# Patient Record
Sex: FEMALE | Race: BLACK OR AFRICAN AMERICAN
[De-identification: names, ages, dates, MRNs, and addresses within clinical notes are randomized per-mention and may not be internally consistent; named-entity substitution may affect disease eponyms.]

---

## 2017-03-18 ENCOUNTER — HOSPITAL ENCOUNTER (EMERGENCY)
Dept: HOSPITAL 62 - ER | Age: 67
Discharge: HOME | End: 2017-03-18
Payer: SELF-PAY

## 2017-03-18 VITALS — DIASTOLIC BLOOD PRESSURE: 98 MMHG | SYSTOLIC BLOOD PRESSURE: 192 MMHG

## 2017-03-18 DIAGNOSIS — R04.0: Primary | ICD-10-CM

## 2017-03-18 DIAGNOSIS — F17.210: ICD-10-CM

## 2017-03-18 DIAGNOSIS — R03.0: ICD-10-CM

## 2017-03-18 PROCEDURE — 99283 EMERGENCY DEPT VISIT LOW MDM: CPT

## 2017-03-18 NOTE — ER DOCUMENT REPORT
HPI





- HPI


Patient complains to provider of: nose bleed


Onset: This morning


Onset/Duration: Sudden


Pain Level: 5


Context: 


Pt presents to the ED with c/o nose bleed pto. She reports she has had this in 

the past. Once she had to go the ED four times in one day because it would not 

stop bleeding. She denies injury.  Patient reports she sleeps with the window 

open.  Does not use a humidifier.  Pt bp elevated. She reports hx of High blood 

pressure, untreated. She does not have insurance. She reports pressure headache 

now, no medications taken. Denies f/n/v/d.   Patient also admits smoking and 

drinking tequila weekly. No active nose bleed now. 


Associated Symptoms: Headache


Exacerbated by: Denies


Relieved by: Denies


Similar symptoms previously: Yes


Recently seen / treated by doctor: No





- DERM


Skin Color: Normal





Past Medical History





- General


Information source: Patient


Last Menstrual Period: years- menopause





- Social History


Smoking Status: Current Every Day Smoker


Cigarette use (# per day): Yes - gulshan


Chew tobacco use (# tins/day): No


Frequency of alcohol use: Social


Drug Abuse: None


Occupation: none


Lives with: Family


Family History: Reviewed & Not Pertinent


Patient has suicidal ideation: No


Patient has homicidal ideation: No





- Past Medical History


Cardiac Medical History: Reports: Hx Hypertension


Renal/ Medical History: Denies: Hx Peritoneal Dialysis


Surgical Hx: Negative





- Immunizations


Hx Diphtheria, Pertussis, Tetanus Vaccination: No





Vertical Provider Document





- CONSTITUTIONAL


Agree With Documented VS: Yes


Exam Limitations: No Limitations


General Appearance: WD/WN, No Apparent Distress





- INFECTION CONTROL


TRAVEL OUTSIDE OF THE [place] IN LAST 30 DAYS: No





- HEENT


HEENT: Atraumatic, Normocephalic.  negative: Conjuctival Injection, Pharyngeal 

Exudate, Pharyngeal Tenderness, Pharyngeal Erythema, Tympanic Membrane Red, 

Tympanic Membrane Bulging


Notes: 


right nare with erythema,no active bleeding, no septal hematoma





- NECK


Neck: Normal Inspection, Supple.  negative: Lymphadenopathy-Left, 

Lymphadenopathy-Right





- RESPIRATORY


Respiratory: Breath Sounds Normal, No Respiratory Distress


O2 Sat by Pulse Oximetry: 96





- CARDIOVASCULAR


Cardiovascular: Regular Rate, Regular Rhythm





- GI/ABDOMEN


Gastrointestinal: Abdomen Soft





- MUSCULOSKELETAL/EXTREMETIES


Musculoskeletal/Extremeties: MAEW, FROM





- NEURO


Level of Consciousness: Awake, Alert, Appropriate


Motor/Sensory: No Motor Deficit





- DERM


Integumentary: Warm, Dry





Course





- Re-evaluation


Re-evalutation: 


03/18/17 13:31


Pt instructed on care of nose bleeds. I spent over 15 minutes discussing HTN. 

Pt was instructed on the [facility], low sodium diet, exercise, 

quit smoking. Pt was also instructed on the risks of high blood pressure (stroke

, MI, etc.)  patient verbalized understanding to all instructions.  Patient 

reports she will follow-up with [facility].  Patient also reported 

that she will quit adding extra seasoning to her foods. 





 





- Vital Signs


Vital signs: 


 











Temp Pulse Resp BP Pulse Ox


 


 98.4 F   88   16   189/92 H  96 


 


 03/18/17 12:17  03/18/17 12:17  03/18/17 12:17  03/18/17 12:17  03/18/17 12:17














Discharge





- Discharge


Clinical Impression: 


 Bleeding nose, Elevated blood pressure reading





Condition: Stable


Disposition: HOME, SELF-CARE


Instructions:  High Blood Pressure ([facility]), [facility], Stop Smoking 

([facility])


Additional Instructions: 


*You have been evaluated for nose bleed, elevated blood pressure


*Monitor your diet- low sodium, quit smoking ,exercise


*Do not blow your nose, humidified air


*Take motrin as indicated


*Follow up with the [facility] within one week


*Return to ED for worsening condition, changes, needs


Forms:  Smoking Cessation Education, Elevated Blood Pressure

## 2017-03-18 NOTE — ER DOCUMENT REPORT
ED Medical Screen (RME)





- General


Stated Complaint: NOSE BLEED


Notes: 


patient states she had a nosebleed one hour ago, has stopped. she does admit to 

a headache that started today


has a h/o HTN but she is uninsured and does not see a PCP





I have greeted and performed a rapid initial assessment of this patient. A 

comprehensive ED assessment and evaluation of the patient, analysis of test 

results and completion of the medical decision making process will be conducted 

by additional ED providers.











- Related Data


Allergies/Adverse Reactions: 


 





acetaminophen [From Tylenol] Allergy (Verified 03/18/17 12:16)


 











Past Medical History





- Past Medical History


Cardiac Medical History: Reports: Hx Hypertension





- Immunizations


Hx Diphtheria, Pertussis, Tetanus Vaccination: No

## 2017-04-11 ENCOUNTER — HOSPITAL ENCOUNTER (OUTPATIENT)
Dept: HOSPITAL 62 - WI | Age: 67
End: 2017-04-11
Payer: COMMERCIAL

## 2017-04-11 DIAGNOSIS — Z12.31: Primary | ICD-10-CM

## 2017-04-11 PROCEDURE — G0202 SCR MAMMO BI INCL CAD: HCPCS

## 2017-04-11 PROCEDURE — 77067 SCR MAMMO BI INCL CAD: CPT

## 2017-06-27 ENCOUNTER — HOSPITAL ENCOUNTER (EMERGENCY)
Dept: HOSPITAL 62 - ER | Age: 67
Discharge: HOME | End: 2017-06-27
Payer: SELF-PAY

## 2017-06-27 VITALS — DIASTOLIC BLOOD PRESSURE: 95 MMHG | SYSTOLIC BLOOD PRESSURE: 190 MMHG

## 2017-06-27 DIAGNOSIS — R21: Primary | ICD-10-CM

## 2017-06-27 DIAGNOSIS — I10: ICD-10-CM

## 2017-06-27 DIAGNOSIS — F17.200: ICD-10-CM

## 2017-06-27 PROCEDURE — 99282 EMERGENCY DEPT VISIT SF MDM: CPT

## 2017-06-27 NOTE — ER DOCUMENT REPORT
HPI





- HPI


Patient complains to provider of: skin rash


Onset: Other - 7 months


Onset/Duration: Waxing and waning


Quality of pain: No pain


Pain Level: Denies


Context: 


Patient presents complaining of skin rash to face and neck area off and on for 

the past 7 months.  Patient states rash has become constant for the past month.

  Patient became concerned about the skin rash as she has an upcoming family 

reunion.  Patient denies any new detergents or medications.  Patient does 

states she has been using various topical over-the-counter medications such as 

antibiotic cream and attempts to treat her skin rash.


Associated Symptoms: Other - skin rash


Exacerbated by: Denies


Relieved by: Denies


Similar symptoms previously: Yes


Recently seen / treated by doctor: No





- ROS


ROS below otherwise negative: Yes


Systems Reviewed and Negative: Yes All other systems reviewed and negative





- CONSTITUTIONAL


Constitutional: DENIES: Fever, Chills





- EENT


EENT: DENIES: Eye problems





- NEURO


Neurology: DENIES: Headache





- GASTROINTESTINAL


Gastrointestinal: DENIES: Nausea, Patient vomiting





- DERM


Skin Color: Normal


Skin Problems: Rash





Past Medical History





- General


Information source: Patient





- Social History


Smoking Status: Current Every Day Smoker


Frequency of alcohol use: Occasional


Drug Abuse: None


Occupation: none


Family History: Reviewed & Not Pertinent


Patient has suicidal ideation: No


Patient has homicidal ideation: No





- Past Medical History


Cardiac Medical History: Reports: Hx Hypertension


Renal/ Medical History: Denies: Hx Peritoneal Dialysis


Surgical Hx: Negative





- Immunizations


Hx Diphtheria, Pertussis, Tetanus Vaccination: No





Vertical Provider Document





- CONSTITUTIONAL


Agree With Documented VS: Yes


Exam Limitations: No Limitations


General Appearance: WD/WN, No Apparent Distress





- INFECTION CONTROL


TRAVEL OUTSIDE OF THE [place] IN LAST 30 DAYS: No





- HEENT


HEENT: Atraumatic, Normal ENT Exam, Normocephalic


Notes: 


No angioedema





- NECK


Neck: Normal Inspection, Supple.  negative: Lymphadenopathy-Left, 

Lymphadenopathy-Right





- RESPIRATORY


Respiratory: Breath Sounds Normal, No Respiratory Distress, Chest Non-Tender


O2 Sat by Pulse Oximetry: 98





- CARDIOVASCULAR


Cardiovascular: Regular Rate, Regular Rhythm, No Murmur





- MUSCULOSKELETAL/EXTREMETIES


Musculoskeletal/Extremeties: MAEW





- NEURO


Level of Consciousness: Awake, Alert, Appropriate


Motor/Sensory: No Motor Deficit





- DERM


Integumentary: Warm, Dry, Rash - Erythematous maculopapular rash distributed to 

face, neck and upper chest and back area.





Course





- Re-evaluation


Re-evalutation: 





06/27/17 10:49


The patient has been informed that they may have pre-hypertension or 

hypertension based on a blood pressure reading in the emergency department.  I 

recommend that patient call the primary care provider listed on their discharge 

instructions or a physician of their choice by this week to arrange follow-up 

for further evaluation of possible pre-hypertension her hypertension.





- Vital Signs


Vital signs: 


 











Temp Pulse Resp BP Pulse Ox


 


 98.1 F   102 H  16   190/95 H  98 


 


 06/27/17 10:25  06/27/17 10:25  06/27/17 10:25  06/27/17 10:25  06/27/17 10:25














Discharge





- Discharge


Clinical Impression: 


 Hx of essential hypertension, Skin rash





Condition: Stable


Disposition: HOME, SELF-CARE


Instructions:  Use of Diphenhydramine, Steroid Medication


Additional Instructions: 


Return immediately for any new or worsening symptoms





Followup with your primary care provider, call tomorrow to make a followup 

appointment





Follow-up with a dermatologist for further evaluation of facial rash





Follow-up with your primary doctor to recheck your blood pressure next week


Prescriptions: 


Prednisone [Deltasone 20 mg Tablet] 3 tab PO DAILY 4 Days


Forms:  Elevated Blood Pressure


Referrals: 


[facility] [Provider Group] - Follow up as needed


SLIVIA COREAS DO [ACTIVE STAFF] - Follow up in 3-5 days

## 2017-09-19 ENCOUNTER — HOSPITAL ENCOUNTER (EMERGENCY)
Dept: HOSPITAL 62 - ER | Age: 67
Discharge: HOME | End: 2017-09-19
Payer: MEDICARE

## 2017-09-19 VITALS — DIASTOLIC BLOOD PRESSURE: 104 MMHG | SYSTOLIC BLOOD PRESSURE: 183 MMHG

## 2017-09-19 DIAGNOSIS — F17.200: ICD-10-CM

## 2017-09-19 DIAGNOSIS — H10.9: Primary | ICD-10-CM

## 2017-09-19 DIAGNOSIS — I10: ICD-10-CM

## 2017-09-19 PROCEDURE — 99283 EMERGENCY DEPT VISIT LOW MDM: CPT

## 2017-09-19 NOTE — ER DOCUMENT REPORT
HPI





- HPI


Onset: Yesterday


Onset/Duration: Gradual


Quality of pain: Burning


Severity: Moderate


Pain Level: 4


Context: 





Patient states her left eye started out itching yesterday and she has been 

rubbing it.  I is now red and has clear drainage.  Patient does not wear 

contacts.  Patient states vision is normal.


Exacerbated by: Denies


Relieved by: Denies


Similar symptoms previously: No


Recently seen / treated by doctor: No





- ROS


ROS below otherwise negative: Yes


Systems Reviewed and Negative: Yes All other systems reviewed and negative





- CONSTITUTIONAL


Constitutional: DENIES: Fever





- EENT


EENT: REPORTS: Eye problems - Left.  DENIES: Congestion





- NEURO


Neurology: REPORTS: Headache





- CARDIOVASCULAR


Cardiovascular: DENIES: Chest pain





- RESPIRATORY


Respiratory: DENIES: Trouble Breathing





- GASTROINTESTINAL


Gastrointestinal: DENIES: Abdominal Pain





- REPRODUCTIVE


Reproductive: DENIES: Pregnant:





- MUSCULOSKELETAL


Musculoskeletal: DENIES: Extremity pain





- DERM


Skin Color: Normal


Skin Problems: None





Past Medical History





- General


Information source: Patient





- Social History


Smoking Status: Current Every Day Smoker


Chew tobacco use (# tins/day): No


Frequency of alcohol use: Occasional


Drug Abuse: None


Lives with: Spouse/Significant other


Family History: Reviewed & Not Pertinent


Patient has suicidal ideation: No


Patient has homicidal ideation: No





- Past Medical History


Cardiac Medical History: Reports: Hx Hypertension


Past Surgical History: Reports: Hx Herniorrhaphy





- Immunizations


Hx Diphtheria, Pertussis, Tetanus Vaccination: No





Vertical Provider Document





- CONSTITUTIONAL


Agree With Documented VS: Yes


Exam Limitations: No Limitations


General Appearance: WD/WN, No Apparent Distress





- INFECTION CONTROL


TRAVEL OUTSIDE OF THE [place] IN LAST 30 DAYS: No





- HEENT


HEENT: Atraumatic, Conjuctival Injection, Normal ENT Exam, Normocephalic, PERRLA


Notes: 





Left eye injected without drainage or tearing noted.  Small amount of colored 

drainage noted to right inner eye, conjunctivae clear.





- NECK


Neck: Normal Inspection





- RESPIRATORY


Respiratory: Breath Sounds Normal, No Respiratory Distress


O2 Sat by Pulse Oximetry: 98





- CARDIOVASCULAR


Cardiovascular: Regular Rate, Regular Rhythm





- MUSCULOSKELETAL/EXTREMETIES


Musculoskeletal/Extremeties: MAEW





- NEURO


Level of Consciousness: Awake, Alert, Appropriate





- DERM


Integumentary: Warm, Dry





Course





- Re-evaluation


Re-evalutation: 





09/19/17 14:46


Left eye anesthetized with tetracaine.  Fluorescein instilled, and was flushed 

with 5 mL's of normal saline.  No foreign body or abrasion noted.  Patient 

tolerated procedure well





- Vital Signs


Vital signs: 


 











Temp Pulse Resp BP Pulse Ox


 


 97.9 F   76   16   183/104 H  98 


 


 09/19/17 13:21  09/19/17 13:21  09/19/17 13:21  09/19/17 13:21  09/19/17 13:21














Discharge





- Discharge


Clinical Impression: 


Conjunctivitis, left eye


Qualifiers:


 Conjunctivitis type: unspecified Qualified Code(s): H10.9 - Unspecified 

conjunctivitis





Condition: Good


Disposition: HOME, SELF-CARE


Instructions:  Antibiotic Therapy ([facility]), Conjunctivitis ([facility]), Eyedrop Use ([facility])


Additional Instructions: 


Use drops as directed


Do not rub eye


Follow-up with your primary care doctor this week for recheck or your eye doctor


Return if worsens and as needed

## 2018-03-27 ENCOUNTER — HOSPITAL ENCOUNTER (EMERGENCY)
Dept: HOSPITAL 62 - ER | Age: 68
Discharge: HOME | End: 2018-03-27
Payer: MEDICARE

## 2018-03-27 VITALS — DIASTOLIC BLOOD PRESSURE: 81 MMHG | SYSTOLIC BLOOD PRESSURE: 149 MMHG

## 2018-03-27 DIAGNOSIS — M79.89: ICD-10-CM

## 2018-03-27 DIAGNOSIS — I10: ICD-10-CM

## 2018-03-27 DIAGNOSIS — F17.210: ICD-10-CM

## 2018-03-27 DIAGNOSIS — M25.511: Primary | ICD-10-CM

## 2018-03-27 DIAGNOSIS — Z88.6: ICD-10-CM

## 2018-03-27 PROCEDURE — 73030 X-RAY EXAM OF SHOULDER: CPT

## 2018-03-27 PROCEDURE — 93005 ELECTROCARDIOGRAM TRACING: CPT

## 2018-03-27 PROCEDURE — 96372 THER/PROPH/DIAG INJ SC/IM: CPT

## 2018-03-27 PROCEDURE — 93010 ELECTROCARDIOGRAM REPORT: CPT

## 2018-03-27 PROCEDURE — 93971 EXTREMITY STUDY: CPT

## 2018-03-27 PROCEDURE — 99284 EMERGENCY DEPT VISIT MOD MDM: CPT

## 2018-03-27 NOTE — RADIOLOGY REPORT (SQ)
EXAM DESCRIPTION:  SHOULDER LEFT 2 OR MORE VIEWS



COMPLETED DATE/TIME:  3/27/2018 11:45 am



REASON FOR STUDY:  shoulder pain



COMPARISON:  None.



NUMBER OF VIEWS:  Four views.



TECHNIQUE:  Internal rotation, external rotation, Y view and transscapular images acquired of the

left shoulder.



LIMITATIONS:  None.



FINDINGS:  MINERALIZATION: Normal.

BONES: No acute fracture or dislocation. No worrisome bone lesions.

JOINTS: No dislocation.

VISUALIZED LUNGS AND RIBS: No pneumothorax.  No rib fracture.

SOFT TISSUES: Calcification within the biceps tendon.

OTHER: No other significant finding.



IMPRESSION:  No bony abnormality.  Calcification within the biceps tendon is that may be associated w
ith biceps tendonitis.



TECHNICAL DOCUMENTATION:  JOB ID:  [id number]

 2011 [employer] All Rights Reserved



Reading location - IP/workstation name: [email or web address]

## 2018-03-27 NOTE — RADIOLOGY REPORT (SQ)
EXAM DESCRIPTION:  VENOUS UNILATERAL UPPER



COMPLETED DATE/TIME:  3/27/2018 1:22 pm



REASON FOR STUDY:  LUE SWELLING



COMPARISON:  None.



TECHNIQUE:  Dynamic and static gray scale and color images acquired of the left arm venous system. Se
lected spectral images acquired with additional compression and augmentation maneuvers. The contralat
eral subclavian vein and internal jugular vein were also imaged. Images stored on PACS.



LIMITATIONS:  None.



FINDINGS:  INTERNAL JUGULAR VEIN: Normal phasicity, compression, augmentation. No visualized echogeni
c material on gray scale. No defects on color images. Comparison opposite side normal.

SUBCLAVIAN VEIN: Normal compression, augmentation. No visualized echogenic material on gray scale. No
 defects on color images.

AXILLARY VEIN: Normal compression, augmentation. No visualized echogenic material on gray scale. No d
efects on color images.

BRACHIAL VEIN: Normal compression, augmentation. No visualized echogenic material on gray scale. No d
efects on color images.

BASILIC VEIN: Normal compression, augmentation. No visualized echogenic material on gray scale. No de
fects on color images.

CEPHALIC VEIN: Normal compression, augmentation. No visualized echogenic material on gray scale. No d
efects on color images.

OTHER: No other significant finding.

CONTRALATERAL SUBCLAVIAN VEIN AND INTERNAL JUGULAR VEIN:

Normal phasicity, compression and augmentation. No visualized echogenic material on gray scale. No de
fects on color images.



IMPRESSION:  NO EVIDENCE DVT OR SVT IN THE LEFT ARM.



TECHNICAL DOCUMENTATION:  JOB ID:  [id number]

 2011 [employer]- All Rights Reserved



Reading location - IP/workstation name: [email or web address]

## 2018-03-27 NOTE — ER DOCUMENT REPORT
ED Extremity Problem, Upper





- General


Mode of Arrival: Ambulatory


Information source: Patient


TRAVEL OUTSIDE OF THE [place] IN LAST 30 DAYS: No





- General


Chief Complaint: Shoulder Pain


Stated Complaint: SHOULDER PAIN


Time Seen by Provider: 03/27/18 10:15


Notes: 





Patient is a 67-year-old female that presents to the emergency department today 

with left shoulder pain for the last 2 days.  Patient states she did not fall 

or have any trauma to her knowledge to cause this pain however she did "drink a 

lot of tequila" the night before this pain began.  Patient has shoulder pain at 

rest but the pain is exacerbated with movement of left shoulder.  Patient 

describes the pain as "sharp shooting" down her arm.  Patient also mentions she 

has noticed left hand and wrist swelling but there is no pain associated with 

that.  Patient denies any shortness of breath, history of MI/CVA, history of PE/

DVT, chest pain, leg swelling, or fall/trauma. (MARIA ELENA MORILLO)





- Related Data


Allergies/Adverse Reactions: 


 





acetaminophen [From Tylenol] Allergy (Verified 03/31/18 17:07)


 











Past Medical History





- General


Information source: Patient





- Social History


Smoking Status: Current Every Day Smoker


Cigarette use (# per day): Yes


Chew tobacco use (# tins/day): No


Frequency of alcohol use: Occasional


Drug Abuse: None


Lives with: Family


Family History: Reviewed & Not Pertinent


Patient has suicidal ideation: No


Patient has homicidal ideation: No





- Past Medical History


Cardiac Medical History: Reports: Hx Hypertension


Past Surgical History: Reports: Hx Herniorrhaphy





- Immunizations


Hx Diphtheria, Pertussis, Tetanus Vaccination: No





Review of Systems





- Review of Systems


Constitutional: No symptoms reported


EENT: No symptoms reported


Cardiovascular: denies: Chest pain


Respiratory: denies: Cough, Short of breath


Gastrointestinal: No symptoms reported


Genitourinary: No symptoms reported


Female Genitourinary: No symptoms reported


Musculoskeletal: See HPI, Joint pain - left shoulder.  denies: Leg swelling


Skin: No symptoms reported


Hematologic/Lymphatic: No symptoms reported


Neurological/Psychological: No symptoms reported


-: Yes All other systems reviewed and negative





Physical Exam





- Vital signs


Vitals: 


 











Temp Pulse Resp BP Pulse Ox


 


 98.4 F   103 H  20   157/84 H  97 


 


 03/27/18 09:34  03/27/18 09:34  03/27/18 09:34  03/27/18 09:34  03/27/18 09:34














- Notes


Notes: 





Physical Exam:


 


General: Alert, appears well. 


 


HEENT: Normocephalic. Atraumatic. PERRL. Extraocular movements intact. 

Oropharynx clear.


 


Neck: Supple. Non-tender.


 


Respiratory: No respiratory distress. Clear and equal breath sounds bilaterally.


 


Cardiovascular: Regular rate and rhythm. 


 


Abdominal: Normal Inspection. Non-tender. No distension. Normal Bowel Sounds. 


 


Back: Non-tender. No deformity or step off.


 


Extremities: Moves all four extremities.


Upper extremities: Limited ROM of left shoulder secondary to pain. Pain with 

palpation of entire left shoulder joint, no crepitus. No hand/wrist tenderness 

with palpation. Mild left soft tissue swelling over left hand/wrist, no 

erythema. 2+ radial pulses. Normal axilla. 


Lower extremities: Normal inspection. No edema. Normal ROM.


 


Neurological: Normal cognition. AAOx4. Normal speech.  


 


Psychological: Normal affect. Normal Mood. 


 


Skin: Warm. Dry. Normal color. (MARIA ELENA MORILLO)





Course





- Re-evaluation


Re-evalutation: 





03/27/18 11:37


Patient well-appearing states that her pain began after awakening 2 days ago 

after having large amounts of alcohol at a party the night before.  Denies any 

traumas or falls.  She does have very minor swelling of her distal right upper 

extremity compared to left otherwise no other concerning physical findings.  

Patient denies any chest pain or shortness of breath at this time.  Her pain is 

reproducible with movement and it is better with rest.  Due to the moderate 

soft tissue swelling will obtain ultrasound of right upper extremity to rule 

out any blood clots.  No signs of infectious signs or symptoms.  We will also 

perform an x-ray of right upper extremity shoulder as she has reproducible pain 

with palpation of the right upper extremity shoulder and with movement actively 

and passively.


03/27/18 13:27


Study negative for DVT with calcific tendinitis on shoulder x-ray otherwise no 

acute findings.  Patient was discharged with sling for support and anti-

inflammatories.  Discussed range of motion exercises daily as well as follow-up 

with her primary care physician 1 week for reevaluation.  Return precautions 

provided (LONG,JOSI H)





- Vital Signs


Vital signs: 


 











Temp Pulse Resp BP Pulse Ox


 


 98.0 F   107 H  17   149/81 H  97 


 


 03/27/18 13:38  03/27/18 13:38  03/27/18 13:38  03/27/18 13:38  03/27/18 13:38














Discharge





- Discharge


Clinical Impression: 


Shoulder pain, left


Qualifiers:


 Chronicity: unspecified Qualified Code(s): M25.512 - Pain in left shoulder





Condition: Good


Disposition: HOME, SELF-CARE


Instructions:  Exercise Program for the Shoulder ([facility]), Shoulder Injury ([facility]), 

Sling to be Used ([facility])


Additional Instructions: 


Please follow-up with your primary care provider in 1 week for reevaluation.  

You may need physical therapy if symptoms are continuing.


Prescriptions: 


Naproxen 500 mg PO BID PRN #30 tablet


 PRN Reason: 


Scribe Attestation: 





04/02/18 07:01


I personally performed the services described in the documentation, reviewed 

and edited the documentation which was dictated to the scribe in my presence, 

and it accurately records my words and actions. (JOSI ANDRADE)





Scribe Documentation





- Scribe


Written by Emre:: Emre Simpson, 3/27/2018 1133


acting as scribe for :: Kilo

## 2018-03-27 NOTE — EKG REPORT
SEVERITY:- BORDERLINE ECG -

SINUS RHYTHM

NONSPECIFIC ST-T CHANGES- INFERIOR LEADS

:

Confirmed by: Alex Corbett MD 27-Mar-2018 13:24:07

## 2018-03-27 NOTE — ER DOCUMENT REPORT
ED Medical Screen (RME)





- General


Chief Complaint: Shoulder Pain


Stated Complaint: SHOULDER PAIN


Time Seen by Provider: 03/27/18 10:15


Notes: 





RME DISCLOSURE


I have seen this patient as part of a Rapid Medical Evaluation and, if 

applicable, placed any initially appropriate orders. The patient will be seen 

and fully evaluated, including a full history and physical exam, by a provider (

in Main ED or Fast Track) when a room becomes available.





------------------------------------------------------------------





67-year-old female PMH hypertension here with complaints of left shoulder pain 

that started 1-2 days ago.  She also has some shortness of breath however 

states that it is the same shortness of breath she has had for years and is not 

any different.  She denies any chest pain or discomfort.  The shoulder pain is 

worse with deep breathing and also with moving her left upper extremity.  She 

has no prior history of PE or DVT.  She does not currently feel nervous or 

anxious and states that her heart rate is not usually high.





EXAM


Tachycardic low 100s


TRAVEL OUTSIDE OF THE [place] IN LAST 30 DAYS: No





- Related Data


Allergies/Adverse Reactions: 


 





acetaminophen [From Tylenol] Allergy (Verified 03/27/18 09:30)


 











Past Medical History





- Past Medical History


Cardiac Medical History: Reports: Hx Hypertension


Renal/ Medical History: Denies: Hx Peritoneal Dialysis


Past Surgical History: Reports: Hx Herniorrhaphy





- Immunizations


Hx Diphtheria, Pertussis, Tetanus Vaccination: No





Physical Exam





- Vital signs


Vitals: 





 











Temp Pulse Resp BP Pulse Ox


 


 98.4 F   103 H  20   157/84 H  97 


 


 03/27/18 09:34  03/27/18 09:34  03/27/18 09:34  03/27/18 09:34  03/27/18 09:34














Course





- Vital Signs


Vital signs: 





 











Temp Pulse Resp BP Pulse Ox


 


 98.4 F   103 H  20   157/84 H  97 


 


 03/27/18 09:34  03/27/18 09:34  03/27/18 09:34  03/27/18 09:34  03/27/18 09:34

## 2018-03-31 ENCOUNTER — HOSPITAL ENCOUNTER (EMERGENCY)
Dept: HOSPITAL 62 - ER | Age: 68
Discharge: HOME | End: 2018-03-31
Payer: MEDICARE

## 2018-03-31 VITALS — SYSTOLIC BLOOD PRESSURE: 167 MMHG | DIASTOLIC BLOOD PRESSURE: 89 MMHG

## 2018-03-31 DIAGNOSIS — F17.200: ICD-10-CM

## 2018-03-31 DIAGNOSIS — M47.9: ICD-10-CM

## 2018-03-31 DIAGNOSIS — I10: ICD-10-CM

## 2018-03-31 DIAGNOSIS — M16.12: Primary | ICD-10-CM

## 2018-03-31 PROCEDURE — 72110 X-RAY EXAM L-2 SPINE 4/>VWS: CPT

## 2018-03-31 PROCEDURE — 73502 X-RAY EXAM HIP UNI 2-3 VIEWS: CPT

## 2018-03-31 PROCEDURE — 99284 EMERGENCY DEPT VISIT MOD MDM: CPT

## 2018-03-31 PROCEDURE — S0119 ONDANSETRON 4 MG: HCPCS

## 2018-03-31 PROCEDURE — 96372 THER/PROPH/DIAG INJ SC/IM: CPT

## 2018-03-31 NOTE — RADIOLOGY REPORT (SQ)
EXAM DESCRIPTION:  HIP LEFT AP/LATERAL



COMPLETED DATE/TIME:  3/31/2018 6:43 pm



REASON FOR STUDY:  pain



COMPARISON:  None.



NUMBER OF VIEWS:  Two views.



TECHNIQUE:  AP pelvis and additional frog-leg view of the left hip.



LIMITATIONS:  None.



FINDINGS:  MINERALIZATION: Normal.

LEFT HIP: No fracture or dislocation.  No worrisome bone lesions.  Background of degenerative changes
.

RIGHT HIP: No fracture or dislocation.  No worrisome bone lesions.  Background of degenerative change
s.

PUBIS AND ISCHIUM: No fracture.

PELVIS: No fracture.

SACRUM: No fracture or dislocation. No worrisome bone lesions.

LOWER LUMBAR SPINE: No fracture or dislocation. No worrisome bone lesions.  Spondylotic changes are p
resent.

SOFT TISSUES: No findings.

OTHER: No other significant finding.



IMPRESSION:  No evidence of acute osseous injury.  Background hip and spine degenerative changes.



TECHNICAL DOCUMENTATION:  JOB ID:  [id number]

 2011 [employer]- All Rights Reserved



Reading location - IP/workstation name: [email or web address]

## 2018-03-31 NOTE — RADIOLOGY REPORT (SQ)
EXAM DESCRIPTION:  L SPINE WHOLE



COMPLETED DATE/TIME:  3/31/2018 6:43 pm



REASON FOR STUDY:  pain



COMPARISON:  None.



NUMBER OF VIEWS:  Five views including obliques.



TECHNIQUE:  AP, lateral, oblique, and sacral radiographic images acquired of the lumbar spine.



LIMITATIONS:  None.



FINDINGS:  MINERALIZATION: Normal.

SEGMENTATION: Normal.  No transitional anatomy.

ALIGNMENT: Normal.

VERTEBRAE: Maintained height.  No fracture or worrisome bone lesion.

DISCS: Multilevel disc space narrowing with osteophytes.

POSTERIOR ELEMENTS: Pedicles and facets are intact.  No pars defect or posterior arch defects. Facet 
arthropathy is present.

HARDWARE: None in the spine.

PARASPINAL SOFT TISSUES: Normal.

PELVIS: Intact as visualized. No fractures or worrisome bone lesions. SI joints intact.

OTHER: No other significant finding.



IMPRESSION:  SPONDYLOSIS WITHOUT BONE LESION OR FRACTURE.



TECHNICAL DOCUMENTATION:  JOB ID:  [id number]

 2011 [employer]- All Rights Reserved



Reading location - IP/workstation name: [email or web address]

## 2018-03-31 NOTE — ER DOCUMENT REPORT
HPI





- HPI


Patient complains to provider of: Left back and leg pain


Onset: This morning


Pain Level: 5


Context: 





67-year-old HTN, smoker, female called the ambulance because of left back hip 

and leg pain that started this morning.  She was seen in the emergency room 

several days ago and treated with Naprosyn for left shoulder pain which has 

improved.  She thinks she has "a cold" in her leg. No  Fever or chills.  No 

chest pain or shortness of breath.  No abdominal pain.  No nausea vomiting or 

diarrhea. No injury. EKG order was put in by pivot nurse on accident.  No 

saddle anesthesia.


Associated Symptoms: None


Exacerbated by: Movement


Relieved by: Denies


Similar symptoms previously: No


Recently seen / treated by doctor: No





- ROS


ROS below otherwise negative: Yes


Systems Reviewed and Negative: Yes All other systems reviewed and negative





- REPRODUCTIVE


Reproductive: DENIES: Pregnant:





Past Medical History





- General


Information source: Patient





- Social History


Smoking Status: Current Every Day Smoker


Frequency of alcohol use: None


Drug Abuse: None


Lives with: Spouse/Significant other


Family History: Reviewed & Not Pertinent





- Past Medical History


Cardiac Medical History: Reports: Hx Hypertension


Renal/ Medical History: Denies: Hx Peritoneal Dialysis


Past Surgical History: Reports: Hx Herniorrhaphy





- Immunizations


Hx Diphtheria, Pertussis, Tetanus Vaccination: No





Vertical Provider Document





- CONSTITUTIONAL


Agree With Documented VS: Yes


Exam Limitations: No Limitations


Notes: 





dramatic non verbals about the pain, rocking, moaning, rubbing anterior left 

thigh





- INFECTION CONTROL


TRAVEL OUTSIDE OF THE [place] IN LAST 30 DAYS: No





- HEENT


HEENT: Normocephalic





- NECK


Neck: Supple





- RESPIRATORY


Respiratory: Breath Sounds Normal, No Respiratory Distress





- CARDIOVASCULAR


Cardiovascular: Regular Rate, Regular Rhythm





Course





- Re-evaluation


Re-evalutation: 





03/31/18 19:23


Lumbar and hip degenerative changes, no acute changes


pt pain level almost 0.  Will refer to rheumatologist but she will need to go 

to [facility] again








- Vital Signs


Vital signs: 


 











Temp Pulse Resp BP Pulse Ox


 


 98.3 F   88   16   184/93 H  99 


 


 03/31/18 17:02  03/31/18 17:02  03/31/18 17:02  03/31/18 17:02  03/31/18 17:02














Discharge





- Discharge


Clinical Impression: 


 degenerative hip and lumbar spine 





Condition: Good


Disposition: HOME, SELF-CARE


Instructions:  Anti-Inflammatory Medication ([facility]), Arthritis ([facility]), Warm Packs (

[facility])


Additional Instructions: 


continue the naprosyn 500mg twice a day


warm compress


[facility]


see rheumatologist for follow up


to er if worse


Referrals: 


JOSI BANG MD [ACTIVE STAFF] - Follow up as needed

## 2018-07-03 ENCOUNTER — HOSPITAL ENCOUNTER (EMERGENCY)
Dept: HOSPITAL 62 - ER | Age: 68
Discharge: HOME | End: 2018-07-03
Payer: MEDICARE

## 2018-07-03 VITALS — DIASTOLIC BLOOD PRESSURE: 82 MMHG | SYSTOLIC BLOOD PRESSURE: 185 MMHG

## 2018-07-03 DIAGNOSIS — Z88.6: ICD-10-CM

## 2018-07-03 DIAGNOSIS — I10: Primary | ICD-10-CM

## 2018-07-03 DIAGNOSIS — F17.210: ICD-10-CM

## 2018-07-03 DIAGNOSIS — R42: ICD-10-CM

## 2018-07-03 LAB
ADD MANUAL DIFF: NO
ALBUMIN SERPL-MCNC: 4.6 G/DL (ref 3.5–5)
ALP SERPL-CCNC: 90 U/L (ref 38–126)
ALT SERPL-CCNC: 35 U/L (ref 9–52)
ANION GAP SERPL CALC-SCNC: 15 MMOL/L (ref 5–19)
APPEARANCE UR: (no result)
APTT PPP: YELLOW S
AST SERPL-CCNC: 24 U/L (ref 14–36)
BASOPHILS # BLD AUTO: 0.1 10^3/UL (ref 0–0.2)
BASOPHILS NFR BLD AUTO: 1.3 % (ref 0–2)
BILIRUB DIRECT SERPL-MCNC: 0.3 MG/DL (ref 0–0.4)
BILIRUB SERPL-MCNC: 0.5 MG/DL (ref 0.2–1.3)
BILIRUB UR QL STRIP: NEGATIVE
BUN SERPL-MCNC: 20 MG/DL (ref 7–20)
CALCIUM: 9.9 MG/DL (ref 8.4–10.2)
CHLORIDE SERPL-SCNC: 105 MMOL/L (ref 98–107)
CO2 SERPL-SCNC: 26 MMOL/L (ref 22–30)
EOSINOPHIL # BLD AUTO: 0 10^3/UL (ref 0–0.6)
EOSINOPHIL NFR BLD AUTO: 0.7 % (ref 0–6)
ERYTHROCYTE [DISTWIDTH] IN BLOOD BY AUTOMATED COUNT: 14.8 % (ref 11.5–14)
GLUCOSE SERPL-MCNC: 106 MG/DL (ref 75–110)
GLUCOSE UR STRIP-MCNC: NEGATIVE MG/DL
HCT VFR BLD CALC: 36.7 % (ref 36–47)
HGB BLD-MCNC: 12.3 G/DL (ref 12–15.5)
KETONES UR STRIP-MCNC: NEGATIVE MG/DL
LYMPHOCYTES # BLD AUTO: 2.2 10^3/UL (ref 0.5–4.7)
LYMPHOCYTES NFR BLD AUTO: 40 % (ref 13–45)
MCH RBC QN AUTO: 32.3 PG (ref 27–33.4)
MCHC RBC AUTO-ENTMCNC: 33.5 G/DL (ref 32–36)
MCV RBC AUTO: 97 FL (ref 80–97)
MONOCYTES # BLD AUTO: 0.5 10^3/UL (ref 0.1–1.4)
MONOCYTES NFR BLD AUTO: 9 % (ref 3–13)
NEUTROPHILS # BLD AUTO: 2.8 10^3/UL (ref 1.7–8.2)
NEUTS SEG NFR BLD AUTO: 49 % (ref 42–78)
NITRITE UR QL STRIP: NEGATIVE
PH UR STRIP: 6 [PH] (ref 5–9)
PLATELET # BLD: 321 10^3/UL (ref 150–450)
POTASSIUM SERPL-SCNC: 4.1 MMOL/L (ref 3.6–5)
PROT SERPL-MCNC: 8.3 G/DL (ref 6.3–8.2)
PROT UR STRIP-MCNC: NEGATIVE MG/DL
RBC # BLD AUTO: 3.8 10^6/UL (ref 3.72–5.28)
SODIUM SERPL-SCNC: 146.2 MMOL/L (ref 137–145)
SP GR UR STRIP: 1.03
TOTAL CELLS COUNTED % (AUTO): 100 %
UROBILINOGEN UR-MCNC: 2 MG/DL (ref ?–2)
WBC # BLD AUTO: 5.6 10^3/UL (ref 4–10.5)

## 2018-07-03 PROCEDURE — 81001 URINALYSIS AUTO W/SCOPE: CPT

## 2018-07-03 PROCEDURE — 36415 COLL VENOUS BLD VENIPUNCTURE: CPT

## 2018-07-03 PROCEDURE — 70450 CT HEAD/BRAIN W/O DYE: CPT

## 2018-07-03 PROCEDURE — 80053 COMPREHEN METABOLIC PANEL: CPT

## 2018-07-03 PROCEDURE — 85025 COMPLETE CBC W/AUTO DIFF WBC: CPT

## 2018-07-03 PROCEDURE — 99284 EMERGENCY DEPT VISIT MOD MDM: CPT

## 2018-07-03 NOTE — RADIOLOGY REPORT (SQ)
EXAM DESCRIPTION:  CT HEAD WITHOUT



COMPLETED DATE/TIME:  7/3/2018 2:00 pm



REASON FOR STUDY:  headache htn



COMPARISON:  None.



TECHNIQUE:  Axial images acquired through the brain without intravenous contrast.  Images reviewed wi
th bone, brain and subdural windows.  Additional sagittal and coronal reconstructions were generated.
 Images stored on PACS.

All CT scanners at this facility use dose modulation, iterative reconstruction, and/or weight based d
osing when appropriate to reduce radiation dose to as low as reasonably achievable (ALARA).

[facility]: Dose Right  [facility]: CareDose    [facility]: Dose Right    [facility]: Teradose 4D    [facility]: Smart Technologies



RADIATION DOSE:  CT Rad equipment meets quality standard of care and radiation dose reduction techniq
ues were employed. CTDIvol: 53.2 mGy. DLP: 1097 mGy-cm. mGy.



LIMITATIONS:  Streak artifact from right-sided earrings



FINDINGS:  VENTRICLES: Normal size and contour.

CEREBRUM: No masses.  No hemorrhage.  No midline shift.  No evidence for acute infarction. Normal gra
y/white matter differentiation. No areas of low density in the white matter.  Bilateral basal ganglia
 perivascular spaces, benign

CEREBELLUM: No masses.  No hemorrhage.  No alteration of density.  No evidence for acute infarction.

EXTRAAXIAL SPACES: No fluid collections.  No masses.

ORBITS AND GLOBE: No intra- or extraconal masses.  Normal contour of globe without masses.

CALVARIUM: No fracture.

PARANASAL SINUSES: No fluid or mucosal thickening.

SOFT TISSUES: No mass or hematoma.

OTHER: No other significant finding.



IMPRESSION:  NORMAL BRAIN CT WITHOUT CONTRAST.

EVIDENCE OF ACUTE STROKE: NO.



COMMENT:  Quality ID # [id number]: Final reports with documentation of one or more dose reduction techniques
 (e.g., Automated exposure control, adjustment of the mA and/or kV according to patient size, use of 
iterative reconstruction technique)



TECHNICAL DOCUMENTATION:  JOB ID:  [id number]

 2011 [employer]- All Rights Reserved



Reading location - IP/workstation name: [email or web address]

## 2018-07-03 NOTE — ER DOCUMENT REPORT
ED General





- General


Chief Complaint: Dizziness


Stated Complaint: BLOOD PRESSURE ISSUES


Time Seen by Provider: 07/03/18 12:53


Mode of Arrival: Ambulatory


Information source: Patient


Notes: 





67-year-old female presents with complaints of hypertension.  Patient notes 

that she now has Medicare [facility]man clinic would not see her to refill her 

blood pressure medications when she went, she has been on blood pressure 

medications for possibly 5 days.  Patient is on benazepril and amlodipine


TRAVEL OUTSIDE OF THE [place] IN LAST 30 DAYS: No





- HPI


Onset: Last week


Onset/Duration: Persistent


Quality of pain: Achy


Severity: Mild


Pain Level: 1


Associated symptoms: Headache


Exacerbated by: Denies


Relieved by: Denies


Similar symptoms previously: No


Recently seen / treated by doctor: Yes





- Related Data


Allergies/Adverse Reactions: 


 





acetaminophen [From Tylenol] Allergy (Verified 07/03/18 11:58)


 











Past Medical History





- Social History


Smoking Status: Current Every Day Smoker


Cigarette use (# per day): Yes


Chew tobacco use (# tins/day): No


Smoking Education Provided: No


Frequency of alcohol use: Occasional


Drug Abuse: None


Family History: Reviewed & Not Pertinent


Patient has suicidal ideation: No


Patient has homicidal ideation: No





- Past Medical History


Cardiac Medical History: Reports: Hx Hypertension


Renal/ Medical History: Denies: Hx Peritoneal Dialysis


Past Surgical History: Reports: Hx Herniorrhaphy





- Immunizations


Hx Diphtheria, Pertussis, Tetanus Vaccination: No





Review of Systems





- Review of Systems


Notes: 





REVIEW OF SYSTEMS:


CONSTITUTIONAL :  Denies fever,  chills, or sweats.  Denies recent illness.


EENT:   Denies eye, ear, throat, or mouth pain or symptoms.  Denies nasal or 

sinus congestion or discharge.  Denies throat, tongue, or mouth swelling or 

difficulty swallowing.


CARDIOVASCULAR:  Denies chest pain.  Denies palpitations or racing or irregular 

heart beat.  Denies ankle edema.


RESPIRATORY:  Denies cough, cold, or chest congestion.  Denies shortness of 

breath, difficulty breathing, or wheezing.


GASTROINTESTINAL:  Denies abdominal pain or distention.  Denies nausea, vomiting

, or diarrhea.  Denies blood in vomitus, stools, or per rectum.  Denies black, 

tarry stools.  Denies constipation. 


GENITOURINARY:  Denies difficulty urinating, painful urination, burning, 

frequency, blood in urine, or discharge.


FEMALE  GENITOURINARY:  Denies vaginal bleeding, heavy or abnormal periods, 

irregular periods.  Denies vaginal discharge or odor. 


MUSCULOSKELETAL:  Denies back or neck pain or stiffness.  Denies joint pain or 

swelling.


SKIN:   Denies rash, lesions or sores.


HEMATOLOGIC :   Denies easy bruising or bleeding.


LYMPHATIC:  Denies swollen, enlarged glands.


NEUROLOGICAL: Admits to mild headache


PSYCHIATRIC:  Denies anxiety or stress.  Denies depression, suicidal ideation, 

or homicidal ideation.





ALL OTHER SYSTEMS REVIEWED AND NEGATIVE.











PHYSICAL EXAMINATION:





GENERAL: Well-appearing, well-nourished and in no acute distress.





HEAD: Atraumatic, normocephalic.





EYES: Pupils equal round and reactive to light, extraocular movements intact, 

conjunctiva are normal.





ENT: Nares patent, oropharynx clear without exudates.  Moist mucous membranes.





NECK: Normal range of motion, supple without lymphadenopathy





LUNGS: Breath sounds clear to auscultation bilaterally and equal.  No wheezes 

rales or rhonchi.





HEART: Regular rate and rhythm without murmurs





ABDOMEN: Soft, nontender, nondistended abdomen.  No guarding, no rebound.  No 

masses appreciated.





Female : deferred





Musculoskeletal: Normal range of motion, no pitting or edema.  No cyanosis.





NEUROLOGICAL: Cranial nerves grossly intact.  Normal speech, normal gait.  

Normal sensory, motor exams





PSYCH: Normal mood, normal affect.





SKIN: Warm, Dry, normal turgor, no rashes or lesions noted.

















Dictation was performed using Dragon voice recognition software





Physical Exam





- Vital signs


Vitals: 


 











Temp Pulse Resp BP Pulse Ox


 


 98.7 F   100   16   173/90 H  100 


 


 07/03/18 12:12  07/03/18 12:12  07/03/18 12:12  07/03/18 12:12  07/03/18 12:12














Course





- Re-evaluation


Re-evalutation: 





07/03/18 13:07


CT head lab work pending I will otherwise refill her medications


07/03/18 14:21


Lab work imaging notes no significant abnormality, patient overall looks well 

in no distress


We will start the patient back on her medication








After performing a Medical Screening Examination, I estimate there is LOW risk 

for INTRACRANIAL HEMORRHAGE, ISCHEMIC CVA, MALIGNANT DYSRHYTHMIA, ACUTE 

CORONARY SYNDROME, MENINGITIS, PULMONARY EMBOLISM, or SEPSIS thus I consider 

the discharge disposition reasonable.  I have reevaluated this patient multiple 

times and no significant life threatening changes are noted. The patient and I 

have discussed the diagnosis and risks, and we agree with discharging home with 

close follow-up with the understanding that symptoms and presentations can 

change. We also discussed returning to the Emergency Department immediately if 

new or worsening symptoms occur. We have discussed the symptoms which are most 

concerning (e.g., changing or worsening pain, weakness, vomiting, fever) that 

necessitate immediate return.





- Vital Signs


Vital signs: 


 











Temp Pulse Resp BP Pulse Ox


 


 98.7 F   100   16   173/90 H  100 


 


 07/03/18 12:12  07/03/18 12:12  07/03/18 12:12  07/03/18 12:12  07/03/18 12:12














- Laboratory


Result Diagrams: 


 07/03/18 13:32





 07/03/18 13:32


Laboratory results interpreted by me: 


 











  07/03/18 07/03/18 07/03/18





  13:32 13:32 13:32


 


RDW  14.8 H  


 


Sodium   146.2 H 


 


Total Protein   8.3 H 


 


Urine Urobilinogen    2.0 H














- Diagnostic Test


Radiology reviewed: Image reviewed, Reports reviewed





Discharge





- Discharge


Clinical Impression: 


HTN (hypertension)


Qualifiers:


 Hypertension type: essential hypertension Qualified Code(s): I10 - Essential (

primary) hypertension





Condition: Stable


Disposition: HOME, SELF-CARE


Instructions:  Dizziness ([facility])


Additional Instructions: 


Follow up with your physician tomorrow for further care or return to the ED 

IMMEDIATELY if symptoms worsen or new concerns occur. If you cannot afford to 

follow up with your primary care physician a list of low cost clinics have been 

provided at the end of your discharge papers as well.


Prescriptions: 


Amlodipine Besylate 2.5 mg PO DAILY #30 tab


Benazepril HCl 10 mg PO DAILY #30 tablet

## 2020-01-05 ENCOUNTER — HOSPITAL ENCOUNTER (EMERGENCY)
Dept: HOSPITAL 62 - ER | Age: 70
Discharge: LEFT BEFORE DISCHARGE | End: 2020-01-05
Payer: MEDICARE

## 2020-01-05 VITALS — DIASTOLIC BLOOD PRESSURE: 76 MMHG | SYSTOLIC BLOOD PRESSURE: 150 MMHG

## 2020-01-05 DIAGNOSIS — R06.02: ICD-10-CM

## 2020-01-05 DIAGNOSIS — I10: ICD-10-CM

## 2020-01-05 DIAGNOSIS — Z88.6: ICD-10-CM

## 2020-01-05 DIAGNOSIS — R06.2: ICD-10-CM

## 2020-01-05 DIAGNOSIS — F17.200: ICD-10-CM

## 2020-01-05 DIAGNOSIS — J04.10: Primary | ICD-10-CM

## 2020-01-05 DIAGNOSIS — R06.00: ICD-10-CM

## 2020-01-05 LAB
ADD MANUAL DIFF: NO
ALBUMIN SERPL-MCNC: 4.5 G/DL (ref 3.5–5)
ALP SERPL-CCNC: 89 U/L (ref 38–126)
ANION GAP SERPL CALC-SCNC: 15 MMOL/L (ref 5–19)
AST SERPL-CCNC: 33 U/L (ref 14–36)
BASOPHILS # BLD AUTO: 0 10^3/UL (ref 0–0.2)
BASOPHILS NFR BLD AUTO: 0.2 % (ref 0–2)
BILIRUB DIRECT SERPL-MCNC: 0.3 MG/DL (ref 0–0.4)
BILIRUB SERPL-MCNC: 0.3 MG/DL (ref 0.2–1.3)
BUN SERPL-MCNC: 20 MG/DL (ref 7–20)
CALCIUM: 9.6 MG/DL (ref 8.4–10.2)
CHLORIDE SERPL-SCNC: 104 MMOL/L (ref 98–107)
CO2 SERPL-SCNC: 22 MMOL/L (ref 22–30)
EOSINOPHIL # BLD AUTO: 0 10^3/UL (ref 0–0.6)
EOSINOPHIL NFR BLD AUTO: 0.3 % (ref 0–6)
ERYTHROCYTE [DISTWIDTH] IN BLOOD BY AUTOMATED COUNT: 14.3 % (ref 11.5–14)
GLUCOSE SERPL-MCNC: 129 MG/DL (ref 75–110)
HCT VFR BLD CALC: 34.9 % (ref 36–47)
HGB BLD-MCNC: 11.8 G/DL (ref 12–15.5)
LYMPHOCYTES # BLD AUTO: 2.2 10^3/UL (ref 0.5–4.7)
LYMPHOCYTES NFR BLD AUTO: 30.2 % (ref 13–45)
MCH RBC QN AUTO: 33.4 PG (ref 27–33.4)
MCHC RBC AUTO-ENTMCNC: 34 G/DL (ref 32–36)
MCV RBC AUTO: 98 FL (ref 80–97)
MONOCYTES # BLD AUTO: 0.4 10^3/UL (ref 0.1–1.4)
MONOCYTES NFR BLD AUTO: 5 % (ref 3–13)
NEUTROPHILS # BLD AUTO: 4.7 10^3/UL (ref 1.7–8.2)
NEUTS SEG NFR BLD AUTO: 64.3 % (ref 42–78)
PLATELET # BLD: 318 10^3/UL (ref 150–450)
POTASSIUM SERPL-SCNC: 3.3 MMOL/L (ref 3.6–5)
PROT SERPL-MCNC: 8.5 G/DL (ref 6.3–8.2)
RBC # BLD AUTO: 3.54 10^6/UL (ref 3.72–5.28)
TOTAL CELLS COUNTED % (AUTO): 100 %
WBC # BLD AUTO: 7.3 10^3/UL (ref 4–10.5)

## 2020-01-05 PROCEDURE — 99285 EMERGENCY DEPT VISIT HI MDM: CPT

## 2020-01-05 PROCEDURE — 94640 AIRWAY INHALATION TREATMENT: CPT

## 2020-01-05 PROCEDURE — 70360 X-RAY EXAM OF NECK: CPT

## 2020-01-05 PROCEDURE — 96372 THER/PROPH/DIAG INJ SC/IM: CPT

## 2020-01-05 PROCEDURE — 85025 COMPLETE CBC W/AUTO DIFF WBC: CPT

## 2020-01-05 PROCEDURE — 80053 COMPREHEN METABOLIC PANEL: CPT

## 2020-01-05 PROCEDURE — 36415 COLL VENOUS BLD VENIPUNCTURE: CPT

## 2020-01-05 PROCEDURE — 71045 X-RAY EXAM CHEST 1 VIEW: CPT

## 2020-01-05 NOTE — ER DOCUMENT REPORT
ED General





- General


Chief Complaint: Breathing Difficulty


Stated Complaint: SHORTNESS OF BREATH


Time Seen by Provider: 01/05/20 20:21


Primary Care Provider: 


RONNELL DUGAN MD [Primary Care Provider] - Follow up as needed


TRAVEL OUTSIDE OF THE [place] IN LAST 30 DAYS: No





- Related Data


Allergies/Adverse Reactions: 


                                        





acetaminophen [From Tylenol] Allergy (Verified 07/03/18 11:58)


   








Home Medications: bp pill, fluid pill





Past Medical History





- Social History


Smoking Status: Current Every Day Smoker


Family History: Reviewed & Not Pertinent


Patient has suicidal ideation: No


Patient has homicidal ideation: No





- Past Medical History


Cardiac Medical History: Reports: Hx Hypertension


Renal/ Medical History: Denies: Hx Peritoneal Dialysis


Past Surgical History: Reports: Hx Herniorrhaphy





- Immunizations


Hx Diphtheria, Pertussis, Tetanus Vaccination: No





Physical Exam





- Vital signs


Vitals: 


                                        











Temp Pulse Resp BP Pulse Ox


 


 97.5 F   95   24 H  164/87 H  99 


 


 01/05/20 19:30  01/05/20 19:30  01/05/20 19:30  01/05/20 19:30  01/05/20 19:30














- Notes


Notes: 





Presents emerged [place] of shortness of breath and gone for the past couple 

hours.  Little bit of a sore throat with this.  No fevers cough chest pain or 

dysphagia.  There is no contact history obtainable.  She said no rashes or 

itching at all.  She is been on current antihypertensives for a while she denies

any change in her voice choking episode or previous history of wheezing.





Past medical history significant for hypertension no diabetes or heart disease





Social history she does smoke and drink





Review of systems pertinent positives and negatives in HPI otherwise all the 

systems were reviewed and acutely negative








PHYSICIAN EXAM -vital signs are noted triage note and note from triage reviewed


 


GENERAL: Well-appearing, well-nourished and in _no acute distress ____


 


HEAD: Atraumatic, normocephalic.


 


EYES: Pupils equal round and reactive to light, extraocular movements intact, 

sclera anicteric, conjunctiva are normal.


 


ENT: nares patent, oropharynx clear without exudates.  The uvula is midline 

there is no trismus is handling secretions well no swelling of the tongue face 

lips or in the sublingual area normal voice.  No hot potato voice.  Some minimal

tenderness of the trachea moist mucous membranes.  There are some expiratory 

wheezes which he says is worse after treatment


 


NECK:  supple without lymphadenopathy


 


LUNGS: Breath sounds clear to auscultation bilaterally and equal.  There are few

wheezes in the upper lobes


 


HEART: Regular rate and rhythm without murmurs


 


ABDOMEN: Soft, nontender, normoactive bowel sounds. 


 


EXTREMITIES: No deformity, no  edema.  No palpable cords


 


NEUROLOGICAL: No focal neurological deficits. Moves all extremities 

spontaneously and on command.


 


PSYCH: Normal mood, normal affect.


 


SKIN: Warm, Dry, normal turgor, no rashes or lesions noted.





BACK-nontender in the midline





Differential diagnosis includes tracheitis upper respiratory infection 

epiglottitis bronchitis allergic reaction





Course





- Re-evaluation


Re-evalutation: 





01/05/20 22:35


ED waiting the patient advised that we will give her another treatment and get 

some blood work but there is some pulmonary says patient wants to leave AMA.  I 

went to discuss the case with the patient.  She says is hungry and wants to eat.

 Advised we could give her some ice chips or thousand 3 with some 3 right now 

based on her wheezing.  She cannot tolerate the mask well with use a mouthpiece 

for her she still refuses.  A lengthy discussion with the patient with family 

members present the risk of going home including the fact that she could stop 

breathing and die and she understands that.  She will be given a dose of Augm

entin here and treated with Augmentin and prednisone.  Emphasized need further 

follow-up with her family doctor tomorrow if she is not better and return 

immediately if she gets worse patient understands risks of going home alert and 

oriented x4 at this time family members were present the patient has chosen to 

leave the facility against medical advice. The relevant issues have been 

reviewed and discussed with the patient and family at the bedside. At the time 

of this assessment there is no indication for involuntary commitment. The 

patient is alert, oriented, and able to express clearly their reasoning for not 

wanting to remain in the emergency department for further treatment. The patient

is not clinically psychotic, intoxicated, and denies and suicidal ideation. 














- Vital Signs


Vital signs: 


                                        











Temp Pulse Resp BP Pulse Ox


 


 97.5 F   95   24 H  164/87 H  99 


 


 01/05/20 19:30  01/05/20 19:30  01/05/20 19:30  01/05/20 19:30  01/05/20 19:30














- Laboratory


Result Diagrams: 


                                 01/05/20 21:05





                                 01/05/20 21:05


Laboratory results interpreted by me: 


                                        











  01/05/20 01/05/20





  21:05 21:05


 


RBC  3.54 L 


 


Hgb  11.8 L 


 


Hct  34.9 L 


 


MCV  98 H 


 


RDW  14.3 H 


 


Potassium   3.3 L


 


Glucose   129 H


 


Total Protein   8.5 H














- Diagnostic Test


Radiology reviewed: Reports reviewed





Discharge





- Discharge


Clinical Impression: 


 Shortness of breath, Tracheitis





Disposition: AGAINST MEDICAL ADVICE


Additional Instructions: 


Dyspnea, Nonspecific





   You were evaluated for shortness of breath, or dyspnea. Dyspnea has many 

causes, and some are more serious than others. Sometimes it's impossible to 

diagnose the cause of dyspnea with the tests that are available on an emergency 

basis. Based on our evaluation today, you do not need hospitalization now. We 

found no evidence of pneumonia, collapsed lung, blood clots in the lung, tumors,

or heart failure.


     Causes of non-specific dyspnea can include asthma or bronchospasm, h

yperventilation, emotional distress, heart disease, emphysema, fibrosis of the 

lung, and stiffness of the chest wall.


     In healthy individuals with a single episode, it's sometimes reasonable to 

do nothing but wait to see if the problem occurs again. Additional tests used to

evaluate dyspnea can include cardiac stress testing, echocardiography, pulmonary

function testing, CAT scan of the chest, bronchoscopy or pulmonary biopsy.


     Return if shortness of breath persists or worsens, or if you develop chest 

pain, fever, cough, confusion, or fainting.








Please review the discharge instructions, they will tell you about your 

disease/injury and what you need to return to the ED for





Return to the ED if you feel worse or can follow-up with your family doctor





BASED ON MY EVALUATION, I STRONGLY RECOMMEND THAT YOU STAY IN THE HOSPITAL FOR 

FURTHER EVALUATION AND TREATMENT.  YOU WILL BE  LEAVING THE HOSPITAL AGAINST 

MEDICAL ADVICE, IT IS EXTREMELY LIKELY THAT YOUR CONDITION WILL GET WORSE, AND 

YOU MAY SUFFER PERMENANT DISABILITY OR EVEN DEATH.  IT IS VERY IMPORTANT THAT 

YOU FOLLOWUP WITH YOUR FAMILY DOCTOR OR IN THE CLINIC TOMORROW . YOU ARE WELCOME

TO RETURN TO THE ED AT ANYTIME IF YOU WANT TO BE RE-EVALUATED OR IF YOU GET 

WORSE





Do not hesitate to call 911 if your condition gets worse





Stay on a soft diet for 2 to 3 days





Your blood pressure was elevated today needs to be rechecked again in 1 to 2 

weeks to determine if need to be on medication or have your medications 

adjusted.  Untreated hypertension can cause heart attack stroke and kidney 

failure





Your sugar was elevated today and needs to be rechecked again in 2 weeks


Prescriptions: 


Amox Tr/Potassium Clavulanate [Augmentin 400-57 mg/5 mL Suspension] 5 ml PO TID 

#105 ml


Prednisolone [Prelone 15mg/5ml] 15 mg PO BID #30 ml


Forms:  Elevated Blood Pressure


Referrals: 


RONNELL DUGAN MD [Primary Care Provider] - Follow up as needed

## 2020-01-05 NOTE — RADIOLOGY REPORT (SQ)
EXAM DESCRIPTION: 



XR NECK SOFT TISSUE



COMPLETED DATE/TME:  01/05/2020 20:37



CLINICAL HISTORY: 



69 years, Female, eval epiglottitis



COMPARISON:

None.



NUMBER OF VIEWS:

Two



TECHNIQUE:

Frontal and lateral radiographs were obtained



LIMITATIONS:

Lateral projection is limited secondary to motion artifact.



FINDINGS:



Mild multilevel cervical spondylosis is evident, designated by

intervertebral space narrowing and hypertrophic endplate

spurring. This is most pronounced at C5-C6. There is also mild

retrolisthesis of C5 upon C6. No prevertebral soft tissue

swelling. Epiglottis appears normal. The adenoid and lingual

tonsils do not appear significantly enlarged. Calcifications are

evident about the bilateral carotid vasculature. Visualized lung

apices are clear.



IMPRESSION:



No acute radiographic abnormality. Specifically, the epiglottis

does not appear significantly enlarged.

 



copyright 2011 [employer]- All Rights Reserved

## 2020-01-05 NOTE — RADIOLOGY REPORT (SQ)
Chest single view on  1/5/2020 at 9:59 PM 



CLINICAL INDICATION: Shortness of breath



COMPARISON: None



FINDINGS: The lungs are clear. Mild vascular calcification is

noted in the aorta. Cardiac, hilar and mediastinal contours are

within normal limits. Pulmonary vascularity is within normal

limits. No bony abnormality is noted.



IMPRESSION: No active disease.

## 2020-01-05 NOTE — ER DOCUMENT REPORT
ED Medical Screen (RME)





- General


Chief Complaint: Breathing Difficulty


Stated Complaint: SHORTNESS OF BREATH


Time Seen by Provider: 01/05/20 20:21


Notes: 





Patient is a 69-year-old female who presents to the emergency department with a 

chief complaint of upper respiratory wheezing.  Her symptoms started about 2 

hours ago.  Denies any new exposures to anything.





Exam: Expiratory wheezes noted to neck area.  Breath sounds clear.





Patient will receive Decadron and racemic epi.  X-rays will be taken to eval for

epiglottitis.








I have greeted and performed a rapid initial assessment of this patient.  A 

comprehensive ED assessment and evaluation of the patient, analysis of test 

results and completion of medical decision making process will be conducted by 

an additional ED providers.





TRAVEL OUTSIDE OF THE [place] IN LAST 30 DAYS: No





- Related Data


Allergies/Adverse Reactions: 


                                        





acetaminophen [From Tylenol] Allergy (Verified 07/03/18 11:58)


   








Home Medications: bp pill, fluid pill





Past Medical History





- Past Medical History


Cardiac Medical History: Reports: Hx Hypertension


Renal/ Medical History: Denies: Hx Peritoneal Dialysis


Past Surgical History: Reports: Hx Herniorrhaphy





- Immunizations


Hx Diphtheria, Pertussis, Tetanus Vaccination: No





Physical Exam





- Vital signs


Vitals: 





                                        











Temp Pulse Resp BP Pulse Ox


 


 97.5 F   95   24 H  164/87 H  99 


 


 01/05/20 19:30  01/05/20 19:30  01/05/20 19:30  01/05/20 19:30  01/05/20 19:30














Course





- Vital Signs


Vital signs: 





                                        











Temp Pulse Resp BP Pulse Ox


 


 97.5 F   95   24 H  164/87 H  99 


 


 01/05/20 19:30  01/05/20 19:30  01/05/20 19:30  01/05/20 19:30  01/05/20 19:30